# Patient Record
Sex: MALE | NOT HISPANIC OR LATINO | RURAL
[De-identification: names, ages, dates, MRNs, and addresses within clinical notes are randomized per-mention and may not be internally consistent; named-entity substitution may affect disease eponyms.]

---

## 2021-01-04 ENCOUNTER — OFFICE VISIT (OUTPATIENT)
Dept: FAMILY MEDICINE CLINIC | Facility: CLINIC | Age: 46
End: 2021-01-04

## 2021-01-04 VITALS
OXYGEN SATURATION: 99 % | DIASTOLIC BLOOD PRESSURE: 88 MMHG | HEART RATE: 105 BPM | SYSTOLIC BLOOD PRESSURE: 138 MMHG | BODY MASS INDEX: 33.6 KG/M2 | HEIGHT: 60 IN | RESPIRATION RATE: 20 BRPM | TEMPERATURE: 97.5 F | WEIGHT: 171.13 LBS

## 2021-01-04 DIAGNOSIS — G89.29 WRIST PAIN, CHRONIC, LEFT: ICD-10-CM

## 2021-01-04 DIAGNOSIS — R20.0 NUMBNESS AND TINGLING OF LEFT HAND: Primary | ICD-10-CM

## 2021-01-04 DIAGNOSIS — M25.532 WRIST PAIN, CHRONIC, LEFT: ICD-10-CM

## 2021-01-04 DIAGNOSIS — R20.2 NUMBNESS AND TINGLING OF LEFT HAND: Primary | ICD-10-CM

## 2021-01-04 PROCEDURE — 99203 OFFICE O/P NEW LOW 30 MIN: CPT | Performed by: NURSE PRACTITIONER

## 2021-01-04 RX ORDER — NAPROXEN 500 MG/1
500 TABLET ORAL 2 TIMES DAILY PRN
Qty: 60 TABLET | Refills: 0 | Status: SHIPPED | OUTPATIENT
Start: 2021-01-04 | End: 2021-03-04

## 2021-01-04 RX ORDER — CETIRIZINE HYDROCHLORIDE 10 MG/1
10 TABLET ORAL DAILY
COMMUNITY
End: 2021-02-04 | Stop reason: SDUPTHER

## 2021-01-04 NOTE — PROGRESS NOTES
"Chief Complaint  No chief complaint on file.    Subjective          Lul Colmenares presents to [facility] for   [facility]    PCP: none    CC: \"left hand tingling\"    Not had routine medical care in quite some time.  Denies any known chronic medical conditions.     Wrist Pain   The pain is present in the left hand (only notices occasional symptoms in right hand). This is a chronic problem. Episode onset: started 6 months ago. History of extremity trauma: hx of fracture in ? wrist/forearm as a child; does repetitive work  The problem occurs constantly. The problem has been gradually worsening. The quality of the pain is described as aching. The pain is at a severity of 1/10. Associated symptoms include numbness and tingling. Pertinent negatives include no fever, inability to bear weight, itching, joint locking, joint swelling, limited range of motion or stiffness. Associated symptoms comments: Does report decreased  left hand  . The symptoms are aggravated by activity (wakes him up at night and first thing in the morning). He has tried nothing for the symptoms. The treatment provided no relief. Family history does not include gout or rheumatoid arthritis. There is no history of diabetes, gout, osteoarthritis or rheumatoid arthritis.       Objective   Vital Signs:   /88 (BP Location: Right arm, Patient Position: Sitting, Cuff Size: Adult)   Pulse 105   Temp 97.5 °F (36.4 °C) (Temporal)   Resp 20   Ht 152.4 cm (60\")   Wt 77.6 kg (171 lb 2 oz)   SpO2 99%   BMI 33.42 kg/m²     Physical Exam  Vitals signs and nursing note reviewed.   Constitutional:       General: He is not in acute distress.     Appearance: Normal appearance. He is not ill-appearing.   Neck:      Musculoskeletal: Neck supple.   Cardiovascular:      Rate and Rhythm: Normal rate and regular rhythm.   Pulmonary:      Effort: Pulmonary effort is normal. No respiratory distress.      " Breath sounds: Normal breath sounds. No wheezing, rhonchi or rales.   Musculoskeletal:      Left wrist: He exhibits decreased range of motion ( due to discomfort) and tenderness. He exhibits no swelling.      Comments: +phalen, tinels on left     Skin:     General: Skin is warm and dry.   Neurological:      General: No focal deficit present.      Mental Status: He is alert and oriented to person, place, and time.        Result Review :                 Assessment and Plan    Problem List Items Addressed This Visit     None      Visit Diagnoses     Numbness and tingling of left hand    -  Primary    Relevant Medications    naproxen (Naprosyn) 500 MG tablet    Other Relevant Orders    XR Wrist 3+ View Left    Wrist pain, chronic, left        Relevant Medications    naproxen (Naprosyn) 500 MG tablet        Suspect carpal tunnel syndrome.    Wrist splint provided in office ([place]).    Rx for Naproxen as needed.    Xrays of left wrist today--will call with results    Encouraged to schedule appt to establish with PCP prior to leaving today for recheck of wrist pain/paresthesias left hand in 4-6 weeks    Patient's Body mass index is 33.42 kg/m². BMI is above normal parameters. Recommendations include: referral to primary care.    Declines need for RTW note    See PCP or RTC if symptoms persist/worsen  See PCP for routine f/u visit and management of chronic medical conditions      This document has been electronically signed by RUDOLPH Heredia on January 4, 2021 13:50 CST,.

## 2021-01-04 NOTE — PATIENT INSTRUCTIONS
Carpal Tunnel Syndrome    Carpal tunnel syndrome is a condition that causes pain in your hand and arm. The carpal tunnel is a narrow area that is on the palm side of your wrist. Repeated wrist motion or certain diseases may cause swelling in the tunnel. This swelling can pinch the main nerve in the wrist (median nerve).  What are the causes?  This condition may be caused by:  · Repeated wrist motions.  · Wrist injuries.  · Arthritis.  · A sac of fluid (cyst) or abnormal growth (tumor) in the carpal tunnel.  · Fluid buildup during pregnancy.  Sometimes the cause is not known.  What increases the risk?  The following factors may make you more likely to develop this condition:  · Having a job in which you move your wrist in the same way many times. This includes jobs like being a  or a .  · Being a woman.  · Having other health conditions, such as:  ? Diabetes.  ? Obesity.  ? A thyroid gland that is not active enough (hypothyroidism).  ? Kidney failure.  What are the signs or symptoms?  Symptoms of this condition include:  · A tingling feeling in your fingers.  · Tingling or a loss of feeling (numbness) in your hand.  · Pain in your entire arm. This pain may get worse when you bend your wrist and elbow for a long time.  · Pain in your wrist that goes up your arm to your shoulder.  · Pain that goes down into your palm or fingers.  · A weak feeling in your hands. You may find it hard to grab and hold items.  You may feel worse at night.  How is this diagnosed?  This condition is diagnosed with a medical history and physical exam. You may also have tests, such as:  · Electromyogram (EMG). This test checks the signals that the nerves send to the muscles.  · Nerve conduction study. This test checks how well signals pass through your nerves.  · Imaging tests, such as X-rays, ultrasound, and MRI. These tests check for what might be the cause of your condition.  How is this treated?  This condition may be treated  with:  · Lifestyle changes. You will be asked to stop or change the activity that caused your problem.  · Doing exercise and activities that make bones and muscles stronger (physical therapy).  · Learning how to use your hand again (occupational therapy).  · Medicines for pain and swelling (inflammation). You may have injections in your wrist.  · A wrist splint.  · Surgery.  Follow these instructions at home:  If you have a splint:  · Wear the splint as told by your doctor. Remove it only as told by your doctor.  · Loosen the splint if your fingers:  ? Tingle.  ? Lose feeling (become numb).  ? Turn cold and blue.  · Keep the splint clean.  · If the splint is not waterproof:  ? Do not let it get wet.  ? Cover it with a watertight covering when you take a bath or a shower.  Managing pain, stiffness, and swelling    · If told, put ice on the painful area:  ? If you have a removable splint, remove it as told by your doctor.  ? Put ice in a plastic bag.  ? Place a towel between your skin and the bag.  ? Leave the ice on for 20 minutes, 2-3 times per day.  General instructions  · Take over-the-counter and prescription medicines only as told by your doctor.  · Rest your wrist from any activity that may cause pain. If needed, talk with your boss at work about changes that can help your wrist heal.  · Do any exercises as told by your doctor, physical therapist, or occupational therapist.  · Keep all follow-up visits as told by your doctor. This is important.  Contact a doctor if:  · You have new symptoms.  · Medicine does not help your pain.  · Your symptoms get worse.  Get help right away if:  · You have very bad numbness or tingling in your wrist or hand.  Summary  · Carpal tunnel syndrome is a condition that causes pain in your hand and arm.  · It is often caused by repeated wrist motions.  · Lifestyle changes and medicines are used to treat this problem. Surgery may help in very bad cases.  · Follow your doctor's  instructions about wearing a splint, resting your wrist, keeping follow-up visits, and calling for help.  This information is not intended to replace advice given to you by your health care provider. Make sure you discuss any questions you have with your health care provider.  Document Revised: 04/26/2019 Document Reviewed: 04/26/2019  Elsevier Patient Education © 2020 [employer]

## 2021-01-31 DIAGNOSIS — R20.0 NUMBNESS AND TINGLING OF LEFT HAND: ICD-10-CM

## 2021-01-31 DIAGNOSIS — M25.532 WRIST PAIN, CHRONIC, LEFT: ICD-10-CM

## 2021-01-31 DIAGNOSIS — G89.29 WRIST PAIN, CHRONIC, LEFT: ICD-10-CM

## 2021-01-31 DIAGNOSIS — R20.2 NUMBNESS AND TINGLING OF LEFT HAND: ICD-10-CM

## 2021-02-01 RX ORDER — NAPROXEN 500 MG/1
TABLET ORAL
Qty: 60 TABLET | Refills: 0 | OUTPATIENT
Start: 2021-02-01

## 2021-02-03 ENCOUNTER — TELEPHONE (OUTPATIENT)
Dept: FAMILY MEDICINE CLINIC | Facility: CLINIC | Age: 46
End: 2021-02-03

## 2021-02-03 NOTE — TELEPHONE ENCOUNTER
Called to remind of appointment didn't have a voicemail set up. Will need to be here fifteen minuets early./hub to read

## 2021-02-04 ENCOUNTER — OFFICE VISIT (OUTPATIENT)
Dept: FAMILY MEDICINE CLINIC | Facility: CLINIC | Age: 46
End: 2021-02-04

## 2021-02-04 ENCOUNTER — LAB (OUTPATIENT)
Dept: LAB | Facility: HOSPITAL | Age: 46
End: 2021-02-04

## 2021-02-04 VITALS
HEIGHT: 60 IN | DIASTOLIC BLOOD PRESSURE: 87 MMHG | BODY MASS INDEX: 32.59 KG/M2 | WEIGHT: 166 LBS | HEART RATE: 84 BPM | RESPIRATION RATE: 18 BRPM | OXYGEN SATURATION: 99 % | SYSTOLIC BLOOD PRESSURE: 128 MMHG

## 2021-02-04 DIAGNOSIS — J30.2 SEASONAL ALLERGIES: ICD-10-CM

## 2021-02-04 DIAGNOSIS — F17.200 REFUSED SMOKING CESSATION EDUCATIONAL MATERIALS: ICD-10-CM

## 2021-02-04 DIAGNOSIS — E65 CENTRAL OBESITY: ICD-10-CM

## 2021-02-04 DIAGNOSIS — Z13.220 LIPID SCREENING: ICD-10-CM

## 2021-02-04 DIAGNOSIS — Z79.899 HIGH RISK MEDICATION USE: ICD-10-CM

## 2021-02-04 DIAGNOSIS — G56.02 CARPAL TUNNEL SYNDROME OF LEFT WRIST: Primary | ICD-10-CM

## 2021-02-04 DIAGNOSIS — G56.02 CARPAL TUNNEL SYNDROME OF LEFT WRIST: ICD-10-CM

## 2021-02-04 DIAGNOSIS — Z11.59 NEED FOR HEPATITIS C SCREENING TEST: ICD-10-CM

## 2021-02-04 PROCEDURE — 86803 HEPATITIS C AB TEST: CPT | Performed by: STUDENT IN AN ORGANIZED HEALTH CARE EDUCATION/TRAINING PROGRAM

## 2021-02-04 PROCEDURE — 80053 COMPREHEN METABOLIC PANEL: CPT | Performed by: STUDENT IN AN ORGANIZED HEALTH CARE EDUCATION/TRAINING PROGRAM

## 2021-02-04 PROCEDURE — 99214 OFFICE O/P EST MOD 30 MIN: CPT | Performed by: STUDENT IN AN ORGANIZED HEALTH CARE EDUCATION/TRAINING PROGRAM

## 2021-02-04 PROCEDURE — 85025 COMPLETE CBC W/AUTO DIFF WBC: CPT | Performed by: STUDENT IN AN ORGANIZED HEALTH CARE EDUCATION/TRAINING PROGRAM

## 2021-02-04 PROCEDURE — 80061 LIPID PANEL: CPT | Performed by: STUDENT IN AN ORGANIZED HEALTH CARE EDUCATION/TRAINING PROGRAM

## 2021-02-04 PROCEDURE — 83036 HEMOGLOBIN GLYCOSYLATED A1C: CPT | Performed by: STUDENT IN AN ORGANIZED HEALTH CARE EDUCATION/TRAINING PROGRAM

## 2021-02-04 RX ORDER — CETIRIZINE HYDROCHLORIDE 10 MG/1
10 TABLET ORAL DAILY
Qty: 90 TABLET | Refills: 1 | Status: SHIPPED | OUTPATIENT
Start: 2021-02-04

## 2021-02-04 RX ORDER — MELOXICAM 15 MG/1
15 TABLET ORAL DAILY
Qty: 30 TABLET | Refills: 0 | Status: SHIPPED | OUTPATIENT
Start: 2021-02-04 | End: 2021-02-05

## 2021-02-04 RX ORDER — FLUTICASONE PROPIONATE 50 MCG
2 SPRAY, SUSPENSION (ML) NASAL DAILY
Qty: 15.8 ML | Refills: 1 | Status: SHIPPED | OUTPATIENT
Start: 2021-02-04

## 2021-02-04 NOTE — PROGRESS NOTES
"   Subjective:  Lul Colmenares is a 45 y.o. male who presents to establish care and for carpal tunnel follow up    Was diagnosed with carpal tunnel ~1 month ago, given brace and has been compliant. Pt states that he has no improvement in pain or symptoms; predominately located over left thumb, left index finger and middle finger. Of note, has still been working in a factory.  Pain has not gotten worse however it has not improved, negatively affecting his quality of life.    Vitals:    Vitals:    02/04/21 1544   BP: 128/87   Pulse: 84   Resp: 18   SpO2: 99%   Weight: 75.3 kg (166 lb)   Height: 152.4 cm (60\")     Body mass index is 32.42 kg/m².      Current Outpatient Medications:   •  cetirizine (zyrTEC) 10 MG tablet, Take 1 tablet by mouth Daily., Disp: 90 tablet, Rfl: 1  •  naproxen (Naprosyn) 500 MG tablet, Take 1 tablet by mouth 2 (Two) Times a Day As Needed (wrist/hand pain)., Disp: 60 tablet, Rfl: 0  •  fluticasone (Flonase) 50 MCG/ACT nasal spray, 2 sprays into the nostril(s) as directed by provider Daily., Disp: 15.8 mL, Rfl: 1  •  meloxicam (MOBIC) 15 MG tablet, TAKE 1 TABLET BY MOUTH DAILY, Disp: 90 tablet, Rfl: 0    Review of Systems  Review of Systems   Constitutional: Negative for chills and fever.   HENT: Negative for congestion and sore throat.    Eyes: Negative for pain and visual disturbance.   Respiratory: Negative for cough and shortness of breath.    Cardiovascular: Negative for chest pain and palpitations.   Gastrointestinal: Negative for nausea and vomiting.   Endocrine: Negative for polydipsia and polyuria.   Genitourinary: Negative for dysuria and hematuria.   Skin: Negative for rash and wound.   Neurological: Negative for dizziness and headaches.   Psychiatric/Behavioral: Negative for behavioral problems and confusion.       There is no problem list on file for this patient.    Past Surgical History:   Procedure Laterality Date   • NEPHRECTOMY Left      Social History     Socioeconomic History   "   • Marital status: Single     Spouse name: Not on file   • Number of children: Not on file   • Years of education: Not on file   • Highest education level: Not on file   Tobacco Use   • Smoking status: Current Every Day Smoker     Types: Cigarettes   • Smokeless tobacco: Never Used   Substance and Sexual Activity   • Alcohol use: Defer   • Drug use: Defer   • Sexual activity: Defer     History reviewed. No pertinent family history.  No results found for any previous visit.      XR Wrist 3+ View Left  Narrative: XR WRIST 3 OR MORE VIEWS: 1/4/2021 2:16 PM CST    CLINICAL HISTORY:  Numbness tingling left hand.    COMPARISON: None available.    FINDINGS:  The joint spaces are preserved and there is no osseous lesion.   No acute fracture, dislocation or radiopaque foreign body is  present.  Impression: Normal study.    Electronically signed by:  Reagan Joiner MD  1/4/2021 2:40 PM CST  Workstation: [id number]    @[employer]    There is no immunization history on file for this patient.    The following portions of the patient's history were reviewed and updated as appropriate: allergies, current medications, past family history, past medical history, past social history, past surgical history and problem list.    Physical Exam  Physical Exam  Constitutional:       General: He is not in acute distress.  HENT:      Head: Normocephalic and atraumatic.      Right Ear: Tympanic membrane and ear canal normal.      Left Ear: Tympanic membrane and ear canal normal.      Mouth/Throat:      Mouth: Mucous membranes are moist.      Pharynx: Oropharynx is clear. No posterior oropharyngeal erythema.   Eyes:      Extraocular Movements: Extraocular movements intact.      Pupils: Pupils are equal, round, and reactive to light.   Cardiovascular:      Rate and Rhythm: Normal rate and regular rhythm.      Heart sounds: Normal heart sounds. No murmur.   Pulmonary:      Effort: Pulmonary effort is normal.      Breath sounds: Normal breath  sounds.   Abdominal:      General: Bowel sounds are normal.      Palpations: Abdomen is soft.      Tenderness: There is no abdominal tenderness.   Musculoskeletal:         General: No swelling.      Left wrist: He exhibits normal range of motion and no tenderness.      Right lower leg: No edema.      Left lower leg: No edema.      Comments: Positive Phalen and Tinel's test. Reduced strenght of left index finger, full ROM, no deformity or thenar eminence wasting.    Skin:     Coloration: Skin is not jaundiced.      Findings: No rash.   Neurological:      Mental Status: He is alert and oriented to person, place, and time. Mental status is at baseline.   Psychiatric:         Mood and Affect: Mood normal.         Behavior: Behavior normal.         Assessment/Plan    Diagnosis Plan   1. Carpal tunnel syndrome of left wrist  Ambulatory Referral to Hand Surgery    Comprehensive Metabolic Panel   2. High risk medication use  CBC & Differential    CBC Auto Differential   3. Central obesity  Hemoglobin A1c   4. Refused smoking cessation educational materials     5. Need for hepatitis C screening test  Hepatitis C Antibody   6. Lipid screening  Lipid Panel   7. Seasonal allergies  cetirizine (zyrTEC) 10 MG tablet    fluticasone (Flonase) 50 MCG/ACT nasal spray      Orders Placed This Encounter   Procedures   • Comprehensive Metabolic Panel   • Hemoglobin A1c   • Hepatitis C Antibody   • Lipid Panel   • CBC Auto Differential   • Ambulatory Referral to Hand Surgery     Referral Priority:   Routine     Referral Type:   Consultation     Referral Reason:   Specialty Services Required     Requested Specialty:   Hand Surgery     Number of Visits Requested:   1   • CBC & Differential     Order Specific Question:   Manual Differential     Answer:   No     Carpal tunnel; failed conservative management, will refer to hand surgery for further intervention/injection/surgery.  Patient agreeable to plan.    Health maintenance; labs as above,  will obtain A1c due to central obesity and family history.    Seasonal allergies; patient currently well controlled with Zyrtec and Flonase, will continue.  No current exacerbations, reassuring.      This document has been electronically signed by Fidel Clements MD on February 9, 2021 18:22 CST

## 2021-02-05 LAB
ALBUMIN SERPL-MCNC: 4.3 G/DL (ref 3.5–5.2)
ALBUMIN/GLOB SERPL: 1.9 G/DL
ALP SERPL-CCNC: 80 U/L (ref 39–117)
ALT SERPL W P-5'-P-CCNC: 36 U/L (ref 1–41)
ANION GAP SERPL CALCULATED.3IONS-SCNC: 11.8 MMOL/L (ref 5–15)
AST SERPL-CCNC: 25 U/L (ref 1–40)
BASOPHILS # BLD AUTO: 0.07 10*3/MM3 (ref 0–0.2)
BASOPHILS NFR BLD AUTO: 0.7 % (ref 0–1.5)
BILIRUB SERPL-MCNC: 0.3 MG/DL (ref 0–1.2)
BUN SERPL-MCNC: 13 MG/DL (ref 6–20)
BUN/CREAT SERPL: 9.7 (ref 7–25)
CALCIUM SPEC-SCNC: 9.1 MG/DL (ref 8.6–10.5)
CHLORIDE SERPL-SCNC: 104 MMOL/L (ref 98–107)
CHOLEST SERPL-MCNC: 162 MG/DL (ref 0–200)
CO2 SERPL-SCNC: 24.2 MMOL/L (ref 22–29)
CREAT SERPL-MCNC: 1.34 MG/DL (ref 0.76–1.27)
DEPRECATED RDW RBC AUTO: 41 FL (ref 37–54)
EOSINOPHIL # BLD AUTO: 0.44 10*3/MM3 (ref 0–0.4)
EOSINOPHIL NFR BLD AUTO: 4.2 % (ref 0.3–6.2)
ERYTHROCYTE [DISTWIDTH] IN BLOOD BY AUTOMATED COUNT: 12.8 % (ref 12.3–15.4)
GFR SERPL CREATININE-BSD FRML MDRD: 58 ML/MIN/1.73
GFR SERPL CREATININE-BSD FRML MDRD: 70 ML/MIN/1.73
GLOBULIN UR ELPH-MCNC: 2.3 GM/DL
GLUCOSE SERPL-MCNC: 81 MG/DL (ref 65–99)
HBA1C MFR BLD: 5.8 % (ref 4.8–5.6)
HCT VFR BLD AUTO: 43.8 % (ref 37.5–51)
HCV AB SER DONR QL: NORMAL
HDLC SERPL-MCNC: 31 MG/DL (ref 40–60)
HGB BLD-MCNC: 15.1 G/DL (ref 13–17.7)
IMM GRANULOCYTES # BLD AUTO: 0.04 10*3/MM3 (ref 0–0.05)
IMM GRANULOCYTES NFR BLD AUTO: 0.4 % (ref 0–0.5)
LDLC SERPL CALC-MCNC: 106 MG/DL (ref 0–100)
LDLC/HDLC SERPL: 3.34 {RATIO}
LYMPHOCYTES # BLD AUTO: 3.9 10*3/MM3 (ref 0.7–3.1)
LYMPHOCYTES NFR BLD AUTO: 37.4 % (ref 19.6–45.3)
MCH RBC QN AUTO: 31.3 PG (ref 26.6–33)
MCHC RBC AUTO-ENTMCNC: 34.5 G/DL (ref 31.5–35.7)
MCV RBC AUTO: 90.9 FL (ref 79–97)
MONOCYTES # BLD AUTO: 0.76 10*3/MM3 (ref 0.1–0.9)
MONOCYTES NFR BLD AUTO: 7.3 % (ref 5–12)
NEUTROPHILS NFR BLD AUTO: 5.22 10*3/MM3 (ref 1.7–7)
NEUTROPHILS NFR BLD AUTO: 50 % (ref 42.7–76)
NRBC BLD AUTO-RTO: 0.2 /100 WBC (ref 0–0.2)
PLATELET # BLD AUTO: 271 10*3/MM3 (ref 140–450)
PMV BLD AUTO: 11.1 FL (ref 6–12)
POTASSIUM SERPL-SCNC: 4.4 MMOL/L (ref 3.5–5.2)
PROT SERPL-MCNC: 6.6 G/DL (ref 6–8.5)
RBC # BLD AUTO: 4.82 10*6/MM3 (ref 4.14–5.8)
SODIUM SERPL-SCNC: 140 MMOL/L (ref 136–145)
TRIGL SERPL-MCNC: 138 MG/DL (ref 0–150)
VLDLC SERPL-MCNC: 25 MG/DL (ref 5–40)
WBC # BLD AUTO: 10.43 10*3/MM3 (ref 3.4–10.8)

## 2021-02-05 RX ORDER — MELOXICAM 15 MG/1
15 TABLET ORAL DAILY
Qty: 90 TABLET | Refills: 0 | Status: SHIPPED | OUTPATIENT
Start: 2021-02-05

## 2021-03-04 ENCOUNTER — OFFICE VISIT (OUTPATIENT)
Dept: FAMILY MEDICINE CLINIC | Facility: CLINIC | Age: 46
End: 2021-03-04

## 2021-03-04 ENCOUNTER — LAB (OUTPATIENT)
Dept: LAB | Facility: HOSPITAL | Age: 46
End: 2021-03-04

## 2021-03-04 VITALS
DIASTOLIC BLOOD PRESSURE: 68 MMHG | OXYGEN SATURATION: 99 % | WEIGHT: 166 LBS | RESPIRATION RATE: 18 BRPM | HEART RATE: 82 BPM | BODY MASS INDEX: 32.59 KG/M2 | HEIGHT: 60 IN | SYSTOLIC BLOOD PRESSURE: 122 MMHG

## 2021-03-04 DIAGNOSIS — Z72.0 DECLINED SMOKING CESSATION: ICD-10-CM

## 2021-03-04 DIAGNOSIS — R79.89 ELEVATED SERUM CREATININE: Primary | ICD-10-CM

## 2021-03-04 PROCEDURE — 81003 URINALYSIS AUTO W/O SCOPE: CPT | Performed by: STUDENT IN AN ORGANIZED HEALTH CARE EDUCATION/TRAINING PROGRAM

## 2021-03-04 PROCEDURE — 99213 OFFICE O/P EST LOW 20 MIN: CPT | Performed by: STUDENT IN AN ORGANIZED HEALTH CARE EDUCATION/TRAINING PROGRAM

## 2021-03-04 PROCEDURE — 80048 BASIC METABOLIC PNL TOTAL CA: CPT | Performed by: STUDENT IN AN ORGANIZED HEALTH CARE EDUCATION/TRAINING PROGRAM

## 2021-03-04 PROCEDURE — 81015 MICROSCOPIC EXAM OF URINE: CPT | Performed by: STUDENT IN AN ORGANIZED HEALTH CARE EDUCATION/TRAINING PROGRAM

## 2021-03-04 NOTE — PROGRESS NOTES
"   Subjective:  Lul Colmenares is a 45 y.o. male who presents for 1 month follow up    States recently saw the hand surgeon, had an injection; Diagnosed with carpal tunnel of left wrist and nerve impingement. States has not felt relief from injection, and that he likely will need surgery. No acute worsening of symptoms, no swelling, fever, chills.    Smoking; 1/2 ppd. Does not wake up with cravings.  Is not ready to quit currently.  Has not tried medications or nicotine replacement therapy in the past.    Lastly; patient with elevated creatinine on prior labs, denies inappropriate NSAID use, elevation in the past.  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  No urinary symptoms, no dark urine, no swelling, shortness of breath, chest pain.    Vitals:    Vitals:    03/04/21 1607   BP: 122/68   Pulse: 82   Resp: 18   SpO2: 99%   Weight: 75.3 kg (166 lb)   Height: 152.4 cm (60\")     Body mass index is 32.42 kg/m².      Current Outpatient Medications:   •  cetirizine (zyrTEC) 10 MG tablet, Take 1 tablet by mouth Daily., Disp: 90 tablet, Rfl: 1  •  fluticasone (Flonase) 50 MCG/ACT nasal spray, 2 sprays into the nostril(s) as directed by provider Daily., Disp: 15.8 mL, Rfl: 1  •  meloxicam (MOBIC) 15 MG tablet, TAKE 1 TABLET BY MOUTH DAILY, Disp: 90 tablet, Rfl: 0      Review of Systems  Review of Systems   Constitutional: Negative for chills and fever.   HENT: Negative for congestion and sore throat.    Eyes: Negative for pain and visual disturbance.   Respiratory: Negative for cough and shortness of breath.    Cardiovascular: Negative for chest pain and palpitations.   Gastrointestinal: Negative for nausea and vomiting.   Endocrine: Negative for polydipsia and polyuria.   Genitourinary: Negative for dysuria and hematuria.   Skin: Negative for rash and wound.   Neurological: Negative for dizziness and headaches. "   Psychiatric/Behavioral: Negative for behavioral problems and confusion.       There is no problem list on file for this patient.    Past Surgical History:   Procedure Laterality Date   • NEPHRECTOMY Left      Social History     Socioeconomic History   • Marital status: Single     Spouse name: Not on file   • Number of children: Not on file   • Years of education: Not on file   • Highest education level: Not on file   Tobacco Use   • Smoking status: Current Every Day Smoker     Types: Cigarettes   • Smokeless tobacco: Never Used   Substance and Sexual Activity   • Alcohol use: Defer   • Drug use: Defer   • Sexual activity: Defer     Family History   Family history unknown: Yes     Office Visit on 02/04/2021   Component Date Value Ref Range Status   • Glucose 02/04/2021 81  65 - 99 mg/dL Final   • BUN 02/04/2021 13  6 - 20 mg/dL Final   • Creatinine 02/04/2021 1.34* 0.76 - 1.27 mg/dL Final   • Sodium 02/04/2021 140  136 - 145 mmol/L Final   • Potassium 02/04/2021 4.4  3.5 - 5.2 mmol/L Final   • Chloride 02/04/2021 104  98 - 107 mmol/L Final   • CO2 02/04/2021 24.2  22.0 - 29.0 mmol/L Final   • Calcium 02/04/2021 9.1  8.6 - 10.5 mg/dL Final   • Total Protein 02/04/2021 6.6  6.0 - 8.5 g/dL Final   • Albumin 02/04/2021 4.30  3.50 - 5.20 g/dL Final   • ALT (SGPT) 02/04/2021 36  1 - 41 U/L Final   • AST (SGOT) 02/04/2021 25  1 - 40 U/L Final   • Alkaline Phosphatase 02/04/2021 80  39 - 117 U/L Final   • Total Bilirubin 02/04/2021 0.3  0.0 - 1.2 mg/dL Final   • eGFR Non African Amer 02/04/2021 58* >60 mL/min/1.73 Final   • eGFR   Amer 02/04/2021 70  >60 mL/min/1.73 Final   • Globulin 02/04/2021 2.3  gm/dL Final   • A/G Ratio 02/04/2021 1.9  g/dL Final   • BUN/Creatinine Ratio 02/04/2021 9.7  7.0 - 25.0 Final   • Anion Gap 02/04/2021 11.8  5.0 - 15.0 mmol/L Final   • Hemoglobin A1C 02/04/2021 5.80* 4.80 - 5.60 % Final   • Hepatitis C Ab 02/04/2021 Non-Reactive  Non-Reactive Final   • Total Cholesterol 02/04/2021 162   0 - 200 mg/dL Final   • Triglycerides 02/04/2021 138  0 - 150 mg/dL Final   • HDL Cholesterol 02/04/2021 31* 40 - 60 mg/dL Final   • LDL Cholesterol  02/04/2021 106* 0 - 100 mg/dL Final   • VLDL Cholesterol 02/04/2021 25  5 - 40 mg/dL Final   • LDL/HDL Ratio 02/04/2021 3.34   Final   • WBC 02/04/2021 10.43  3.40 - 10.80 10*3/mm3 Final   • RBC 02/04/2021 4.82  4.14 - 5.80 10*6/mm3 Final   • Hemoglobin 02/04/2021 15.1  13.0 - 17.7 g/dL Final   • Hematocrit 02/04/2021 43.8  37.5 - 51.0 % Final   • MCV 02/04/2021 90.9  79.0 - 97.0 fL Final   • MCH 02/04/2021 31.3  26.6 - 33.0 pg Final   • MCHC 02/04/2021 34.5  31.5 - 35.7 g/dL Final   • RDW 02/04/2021 12.8  12.3 - 15.4 % Final   • RDW-SD 02/04/2021 41.0  37.0 - 54.0 fl Final   • MPV 02/04/2021 11.1  6.0 - 12.0 fL Final   • Platelets 02/04/2021 271  140 - 450 10*3/mm3 Final   • Neutrophil % 02/04/2021 50.0  42.7 - 76.0 % Final   • Lymphocyte % 02/04/2021 37.4  19.6 - 45.3 % Final   • Monocyte % 02/04/2021 7.3  5.0 - 12.0 % Final   • Eosinophil % 02/04/2021 4.2  0.3 - 6.2 % Final   • Basophil % 02/04/2021 0.7  0.0 - 1.5 % Final   • Immature Grans % 02/04/2021 0.4  0.0 - 0.5 % Final   • Neutrophils, Absolute 02/04/2021 5.22  1.70 - 7.00 10*3/mm3 Final   • Lymphocytes, Absolute 02/04/2021 3.90* 0.70 - 3.10 10*3/mm3 Final   • Monocytes, Absolute 02/04/2021 0.76  0.10 - 0.90 10*3/mm3 Final   • Eosinophils, Absolute 02/04/2021 0.44* 0.00 - 0.40 10*3/mm3 Final   • Basophils, Absolute 02/04/2021 0.07  0.00 - 0.20 10*3/mm3 Final   • Immature Grans, Absolute 02/04/2021 0.04  0.00 - 0.05 10*3/mm3 Final   • nRBC 02/04/2021 0.2  0.0 - 0.2 /100 WBC Final      XR Wrist 3+ View Left  Narrative: XR WRIST 3 OR MORE VIEWS: 1/4/2021 2:16 PM CST    CLINICAL HISTORY:  Numbness tingling left hand.    COMPARISON: None available.    FINDINGS:  The joint spaces are preserved and there is no osseous lesion.   No acute fracture, dislocation or radiopaque foreign body is  present.  Impression:  Normal study.    Electronically signed by:  Reagan Joiner MD  1/4/2021 2:40 PM [facility]  Workstation: [id number]    @[email or web address]    There is no immunization history on file for this patient.    The following portions of the patient's history were reviewed and updated as appropriate: allergies, current medications, past family history, past medical history, past social history, past surgical history and problem list.        Physical Exam  Physical Exam  Constitutional:       General: He is not in acute distress.  HENT:      Head: Normocephalic and atraumatic.      Right Ear: External ear normal.      Left Ear: External ear normal.      Mouth/Throat:      Mouth: Mucous membranes are moist.      Pharynx: Oropharynx is clear. No posterior oropharyngeal erythema.   Eyes:      Extraocular Movements: Extraocular movements intact.      Pupils: Pupils are equal, round, and reactive to light.   Cardiovascular:      Rate and Rhythm: Normal rate and regular rhythm.      Heart sounds: Normal heart sounds. No murmur.   Pulmonary:      Effort: Pulmonary effort is normal.      Breath sounds: Normal breath sounds.   Abdominal:      General: Bowel sounds are normal.      Palpations: Abdomen is soft.      Tenderness: There is no abdominal tenderness. There is no right CVA tenderness or left CVA tenderness.   Musculoskeletal:         General: No swelling.        Arms:       Right lower leg: No edema.      Left lower leg: No edema.   Skin:     Coloration: Skin is not jaundiced.      Findings: No rash.   Neurological:      Mental Status: He is alert and oriented to person, place, and time. Mental status is at baseline.   Psychiatric:         Mood and Affect: Mood normal.         Behavior: Behavior normal.         Assessment/Plan    Diagnosis Plan   1. Elevated serum creatinine  Basic metabolic panel    Urinalysis With Microscopic - Urine, Clean Catch    Urinalysis without microscopic (no culture) - Urine, Clean Catch    Urinalysis, Microscopic  Only - Urine, Clean Catch   2. Declined smoking cessation        Orders Placed This Encounter   Procedures   • Basic metabolic panel   • Urinalysis without microscopic (no culture) - Urine, Clean Catch   • Urinalysis, Microscopic Only - Urine, Clean Catch   • Urinalysis With Microscopic - Urine, Clean Catch     Elevated serum creatinine; likely secondary to prior nephrectomy, will obtain repeat labs and UA, follow-up in 3 months, sooner if needed.  Asymptomatic, no red flags, reassuring.    Carpal tunnel; patient currently being managed by hand surgeon, will continue to follow.      This document has been electronically signed by Fidel Clements MD on March 4, 2021 17:42 CST

## 2021-03-05 LAB
ANION GAP SERPL CALCULATED.3IONS-SCNC: 11.2 MMOL/L (ref 5–15)
BILIRUB UR QL STRIP: NEGATIVE
BUN SERPL-MCNC: 17 MG/DL (ref 6–20)
BUN/CREAT SERPL: 13.8 (ref 7–25)
CALCIUM SPEC-SCNC: 9 MG/DL (ref 8.6–10.5)
CHLORIDE SERPL-SCNC: 104 MMOL/L (ref 98–107)
CLARITY UR: CLEAR
CO2 SERPL-SCNC: 23.8 MMOL/L (ref 22–29)
COLOR UR: YELLOW
CREAT SERPL-MCNC: 1.23 MG/DL (ref 0.76–1.27)
GFR SERPL CREATININE-BSD FRML MDRD: 64 ML/MIN/1.73
GFR SERPL CREATININE-BSD FRML MDRD: 77 ML/MIN/1.73
GLUCOSE SERPL-MCNC: 104 MG/DL (ref 65–99)
GLUCOSE UR STRIP-MCNC: NEGATIVE MG/DL
HGB UR QL STRIP.AUTO: ABNORMAL
KETONES UR QL STRIP: NEGATIVE
LEUKOCYTE ESTERASE UR QL STRIP.AUTO: NEGATIVE
NITRITE UR QL STRIP: NEGATIVE
PH UR STRIP.AUTO: 6.5 [PH] (ref 5–9)
POTASSIUM SERPL-SCNC: 4.4 MMOL/L (ref 3.5–5.2)
PROT UR QL STRIP: ABNORMAL
SODIUM SERPL-SCNC: 139 MMOL/L (ref 136–145)
SP GR UR STRIP: 1.02 (ref 1–1.03)
UROBILINOGEN UR QL STRIP: ABNORMAL

## 2021-03-06 LAB
BACTERIA UR QL AUTO: ABNORMAL /HPF
GRAN CASTS URNS QL MICRO: ABNORMAL /LPF
HYALINE CASTS UR QL AUTO: ABNORMAL /LPF
RBC # UR: ABNORMAL /HPF
REF LAB TEST METHOD: ABNORMAL
SQUAMOUS #/AREA URNS HPF: ABNORMAL /HPF
WBC UR QL AUTO: ABNORMAL /HPF

## 2021-03-09 DIAGNOSIS — R31.9 HEMATURIA, UNSPECIFIED TYPE: ICD-10-CM

## 2021-03-09 DIAGNOSIS — R80.9 PROTEINURIA, UNSPECIFIED TYPE: Primary | ICD-10-CM

## 2021-03-10 ENCOUNTER — TELEPHONE (OUTPATIENT)
Dept: FAMILY MEDICINE CLINIC | Facility: CLINIC | Age: 46
End: 2021-03-10

## 2021-03-10 NOTE — TELEPHONE ENCOUNTER
----- Message from Fidel Clements MD sent at 3/9/2021  6:27 PM CST -----  Urinalysis significant for protein and blood.  This is an abnormal finding however your kidney function or creatinine has normalized from prior visit   Which is reassuring.  However due to your history and urinary findings I have referred you to a kidney specialist.